# Patient Record
Sex: MALE | Race: WHITE | NOT HISPANIC OR LATINO | URBAN - METROPOLITAN AREA
[De-identification: names, ages, dates, MRNs, and addresses within clinical notes are randomized per-mention and may not be internally consistent; named-entity substitution may affect disease eponyms.]

---

## 2020-07-09 ENCOUNTER — TELEPHONE ENCOUNTER (OUTPATIENT)
Dept: URBAN - METROPOLITAN AREA CLINIC 92 | Facility: CLINIC | Age: 55
End: 2020-07-09

## 2020-07-27 ENCOUNTER — OFFICE VISIT (OUTPATIENT)
Dept: URBAN - METROPOLITAN AREA SURGERY CENTER 31 | Facility: SURGERY CENTER | Age: 55
End: 2020-07-27

## 2020-07-30 ENCOUNTER — OFFICE VISIT (OUTPATIENT)
Dept: URBAN - METROPOLITAN AREA SURGERY CENTER 31 | Facility: SURGERY CENTER | Age: 55
End: 2020-07-30
Payer: COMMERCIAL

## 2020-07-30 ENCOUNTER — CLAIMS CREATED FROM THE CLAIM WINDOW (OUTPATIENT)
Dept: URBAN - METROPOLITAN AREA CLINIC 4 | Facility: CLINIC | Age: 55
End: 2020-07-30
Payer: COMMERCIAL

## 2020-07-30 DIAGNOSIS — K51.00 CHRONIC PANCOLONIC ULCERATIVE COLITIS: ICD-10-CM

## 2020-07-30 DIAGNOSIS — K51.80 OTHER ULCERATIVE COLITIS WITHOUT COMPLICATIONS: ICD-10-CM

## 2020-07-30 DIAGNOSIS — K63.89 OTHER SPECIFIED DISEASES OF INTESTINE: ICD-10-CM

## 2020-07-30 DIAGNOSIS — K51.919 ULCERATIVE COLITIS, UNSPECIFIED WITH UNSPECIFIED COMPLICATIONS: ICD-10-CM

## 2020-07-30 PROCEDURE — 45380 COLONOSCOPY AND BIOPSY: CPT | Performed by: INTERNAL MEDICINE

## 2020-07-30 PROCEDURE — 88305 TISSUE EXAM BY PATHOLOGIST: CPT | Performed by: PATHOLOGY

## 2020-07-30 PROCEDURE — G9937 DIG OR SURV COLSCO: HCPCS | Performed by: INTERNAL MEDICINE

## 2020-07-30 PROCEDURE — G8907 PT DOC NO EVENTS ON DISCHARG: HCPCS | Performed by: INTERNAL MEDICINE

## 2021-02-15 ENCOUNTER — OFFICE VISIT (OUTPATIENT)
Dept: URBAN - METROPOLITAN AREA CLINIC 19 | Facility: CLINIC | Age: 56
End: 2021-02-15

## 2021-03-08 ENCOUNTER — OFFICE VISIT (OUTPATIENT)
Dept: URBAN - METROPOLITAN AREA CLINIC 19 | Facility: CLINIC | Age: 56
End: 2021-03-08
Payer: COMMERCIAL

## 2021-03-08 DIAGNOSIS — K51.00 ULCERATIVE PANCOLITIS WITHOUT COMPLICATION: ICD-10-CM

## 2021-03-08 PROCEDURE — 99212 OFFICE O/P EST SF 10 MIN: CPT | Performed by: INTERNAL MEDICINE

## 2021-03-08 RX ORDER — LISINOPRIL 10 MG/1
TABLET ORAL
Qty: 0 | Refills: 0 | Status: ACTIVE | COMMUNITY
Start: 1900-01-01

## 2021-03-08 RX ORDER — FAMOTIDINE/CA CARB/MAG HYDROX 10-800-165
TABLET,CHEWABLE ORAL
Qty: 0 | Refills: 0 | Status: ACTIVE | COMMUNITY
Start: 1900-01-01

## 2021-03-08 RX ORDER — MESALAMINE 1.2 G/1
4  TABLETS TABLET, DELAYED RELEASE ORAL ONCE A DAY
Qty: 360 TABLET | Refills: 3 | OUTPATIENT
Start: 2021-03-08 | End: 2022-03-03

## 2021-03-08 NOTE — HPI-OTHER HISTORIES
is here for followup if ulcerative pancolitis. He was doing well on Mesalamine 4.8 g/d with no symptoms for over 3 years . We tapered down his dose to 3.6 g /d and he started noticing some symptoms of urgency and loose stools. Colonoscopy also showed mild to moderately active disease along with elevated fecal calprotectin. HEnce his dose was increased back to 4 tabletes a day and he has had no further symptoms since then.  Labs with pCP in Aug 2020 show normal renal function and LFTS.

## 2021-03-09 LAB
A/G RATIO: 1.8
ALBUMIN: 4.9
ALKALINE PHOSPHATASE: 95
ALT (SGPT): 30
AST (SGOT): 27
BASO (ABSOLUTE): 0.1
BASOS: 1
BILIRUBIN, TOTAL: 1.1
BUN/CREATININE RATIO: 13
BUN: 15
CALCIUM: 9.8
CARBON DIOXIDE, TOTAL: 25
CHLORIDE: 101
CREATININE: 1.15
EGFR IF AFRICN AM: 81
EGFR IF NONAFRICN AM: 70
EOS (ABSOLUTE): 0.2
EOS: 2
FERRITIN, SERUM: 215
FOLATE (FOLIC ACID), SERUM: 15.1
GLOBULIN, TOTAL: 2.8
GLUCOSE: 126
HEMATOCRIT: 47.2
HEMATOLOGY COMMENTS:: (no result)
HEMOGLOBIN: 15.6
IMMATURE CELLS: (no result)
IMMATURE GRANS (ABS): 0
IMMATURE GRANULOCYTES: 0
IRON BIND.CAP.(TIBC): 323
IRON SATURATION: 39
IRON: 125
LYMPHS (ABSOLUTE): 2.8
LYMPHS: 33
MCH: 29.1
MCHC: 33.1
MCV: 88
MONOCYTES(ABSOLUTE): 0.6
MONOCYTES: 8
NEUTROPHILS (ABSOLUTE): 4.7
NEUTROPHILS: 56
NRBC: (no result)
PLATELETS: 268
POTASSIUM: 4.6
PROTEIN, TOTAL: 7.7
RBC: 5.37
RDW: 13.1
SODIUM: 139
UIBC: 198
VITAMIN B12: 708
WBC: 8.4

## 2021-03-16 LAB — CALPROTECTIN, FECAL: 197

## 2021-03-25 ENCOUNTER — TELEPHONE ENCOUNTER (OUTPATIENT)
Dept: URBAN - METROPOLITAN AREA CLINIC 92 | Facility: CLINIC | Age: 56
End: 2021-03-25

## 2021-10-26 ENCOUNTER — TELEPHONE ENCOUNTER (OUTPATIENT)
Dept: URBAN - METROPOLITAN AREA CLINIC 92 | Facility: CLINIC | Age: 56
End: 2021-10-26

## 2021-11-19 ENCOUNTER — LAB OUTSIDE AN ENCOUNTER (OUTPATIENT)
Dept: URBAN - METROPOLITAN AREA CLINIC 19 | Facility: CLINIC | Age: 56
End: 2021-11-19

## 2021-11-24 LAB — CALPROTECTIN, FECAL: 125

## 2022-03-17 ENCOUNTER — ERX REFILL RESPONSE (OUTPATIENT)
Dept: URBAN - METROPOLITAN AREA CLINIC 19 | Facility: CLINIC | Age: 57
End: 2022-03-17

## 2022-03-17 RX ORDER — MESALAMINE 1.2 G/1
TAKE 4 TABLETS BY MOUTH EVERY DAY TABLET, DELAYED RELEASE ORAL
Qty: 360 TABLET | Refills: 4 | OUTPATIENT

## 2022-03-28 ENCOUNTER — OFFICE VISIT (OUTPATIENT)
Dept: URBAN - METROPOLITAN AREA CLINIC 19 | Facility: CLINIC | Age: 57
End: 2022-03-28
Payer: COMMERCIAL

## 2022-03-28 ENCOUNTER — WEB ENCOUNTER (OUTPATIENT)
Dept: URBAN - METROPOLITAN AREA CLINIC 19 | Facility: CLINIC | Age: 57
End: 2022-03-28

## 2022-03-28 VITALS
WEIGHT: 201.4 LBS | DIASTOLIC BLOOD PRESSURE: 83 MMHG | OXYGEN SATURATION: 96 % | TEMPERATURE: 98.2 F | SYSTOLIC BLOOD PRESSURE: 173 MMHG | BODY MASS INDEX: 28.83 KG/M2 | HEIGHT: 70 IN | HEART RATE: 63 BPM

## 2022-03-28 DIAGNOSIS — K51.00 ULCERATIVE PANCOLITIS: ICD-10-CM

## 2022-03-28 PROCEDURE — 99213 OFFICE O/P EST LOW 20 MIN: CPT | Performed by: INTERNAL MEDICINE

## 2022-03-28 RX ORDER — FAMOTIDINE/CA CARB/MAG HYDROX 10-800-165
TABLET,CHEWABLE ORAL
Qty: 0 | Refills: 0 | Status: ACTIVE | COMMUNITY
Start: 1900-01-01

## 2022-03-28 RX ORDER — AMLODIPINE BESYLATE 5 MG/1
1 TABLET TABLET ORAL ONCE A DAY
Status: ACTIVE | COMMUNITY

## 2022-03-28 RX ORDER — LISINOPRIL 10 MG/1
TABLET ORAL
Qty: 0 | Refills: 0 | Status: DISCONTINUED | COMMUNITY
Start: 1900-01-01

## 2022-03-28 RX ORDER — MESALAMINE 1.2 G/1
TAKE 4 TABLETS BY MOUTH EVERY DAY TABLET, DELAYED RELEASE ORAL
Qty: 360 TABLET | Refills: 4 | Status: ACTIVE | COMMUNITY

## 2022-03-28 RX ORDER — MESALAMINE 1.2 G/1
TAKE 4 TABLETS BY MOUTH EVERY DAY TABLET, DELAYED RELEASE ORAL
Qty: 360 TABLET | Refills: 4

## 2022-03-28 NOTE — HPI-TODAY'S VISIT:
Mr. Vazquez is here for annual followup of his ulcerative pancolitis. Last labs 6 months ago normal, fecal calprotectin mildlyabnormal at 125 . Since he started fiber supplements stools are normal. Complaint with Mesalamine 4.8 g/d. Prior attempts to cut back to 3.6 g/d caused flare symptoms/

## 2022-06-26 NOTE — PHYSICAL EXAM CONSTITUTIONAL:
well developed, well nourished , in no acute distress , ambulating without difficulty , normal communication ability
2

## 2022-10-11 ENCOUNTER — TELEPHONE ENCOUNTER (OUTPATIENT)
Dept: URBAN - METROPOLITAN AREA CLINIC 19 | Facility: CLINIC | Age: 57
End: 2022-10-11

## 2022-10-12 PROBLEM — 444548001: Status: ACTIVE | Noted: 2022-10-12

## 2022-11-02 ENCOUNTER — WEB ENCOUNTER (OUTPATIENT)
Dept: URBAN - METROPOLITAN AREA SURGERY CENTER 31 | Facility: SURGERY CENTER | Age: 57
End: 2022-11-02

## 2022-11-04 ENCOUNTER — TELEPHONE ENCOUNTER (OUTPATIENT)
Dept: URBAN - METROPOLITAN AREA CLINIC 19 | Facility: CLINIC | Age: 57
End: 2022-11-04

## 2022-11-07 ENCOUNTER — TELEPHONE ENCOUNTER (OUTPATIENT)
Dept: URBAN - METROPOLITAN AREA CLINIC 19 | Facility: CLINIC | Age: 57
End: 2022-11-07

## 2022-11-07 ENCOUNTER — OFFICE VISIT (OUTPATIENT)
Dept: URBAN - METROPOLITAN AREA SURGERY CENTER 31 | Facility: SURGERY CENTER | Age: 57
End: 2022-11-07

## 2023-01-03 ENCOUNTER — WEB ENCOUNTER (OUTPATIENT)
Dept: URBAN - METROPOLITAN AREA SURGERY CENTER 31 | Facility: SURGERY CENTER | Age: 58
End: 2023-01-03

## 2023-01-06 ENCOUNTER — CLAIMS CREATED FROM THE CLAIM WINDOW (OUTPATIENT)
Dept: URBAN - METROPOLITAN AREA SURGERY CENTER 31 | Facility: SURGERY CENTER | Age: 58
End: 2023-01-06

## 2023-01-06 ENCOUNTER — CLAIMS CREATED FROM THE CLAIM WINDOW (OUTPATIENT)
Dept: URBAN - METROPOLITAN AREA SURGERY CENTER 31 | Facility: SURGERY CENTER | Age: 58
End: 2023-01-06
Payer: COMMERCIAL

## 2023-01-06 ENCOUNTER — CLAIMS CREATED FROM THE CLAIM WINDOW (OUTPATIENT)
Dept: URBAN - METROPOLITAN AREA CLINIC 4 | Facility: CLINIC | Age: 58
End: 2023-01-06
Payer: COMMERCIAL

## 2023-01-06 DIAGNOSIS — D12.2 BENIGN NEOPLASM OF ASCENDING COLON: ICD-10-CM

## 2023-01-06 DIAGNOSIS — K51.00 ULCERATIVE PANCOLITIS: ICD-10-CM

## 2023-01-06 DIAGNOSIS — D12.2 ADENOMA OF ASCENDING COLON: ICD-10-CM

## 2023-01-06 DIAGNOSIS — K51.00 ACUTE ULCERATIVE PANCOLITIS: ICD-10-CM

## 2023-01-06 PROBLEM — 444548001: Status: ACTIVE | Noted: 2022-03-28

## 2023-01-06 PROCEDURE — 45380 COLONOSCOPY AND BIOPSY: CPT | Performed by: INTERNAL MEDICINE

## 2023-01-06 PROCEDURE — 45385 COLONOSCOPY W/LESION REMOVAL: CPT | Performed by: INTERNAL MEDICINE

## 2023-01-06 PROCEDURE — G8907 PT DOC NO EVENTS ON DISCHARG: HCPCS | Performed by: INTERNAL MEDICINE

## 2023-01-06 PROCEDURE — 88305 TISSUE EXAM BY PATHOLOGIST: CPT | Performed by: PATHOLOGY

## 2023-01-06 RX ORDER — FAMOTIDINE/CA CARB/MAG HYDROX 10-800-165
TABLET,CHEWABLE ORAL
Qty: 0 | Refills: 0 | Status: ACTIVE | COMMUNITY
Start: 1900-01-01

## 2023-01-06 RX ORDER — BUDESONIDE 3 MG/1
3 CAPSULES CAPSULE ORAL ONCE A DAY
Qty: 90 | Refills: 2 | OUTPATIENT
Start: 2023-01-06

## 2023-01-06 RX ORDER — MESALAMINE 1.2 G/1
TAKE 4 TABLETS BY MOUTH EVERY DAY TABLET, DELAYED RELEASE ORAL
Qty: 360 TABLET | Refills: 4 | Status: ACTIVE | COMMUNITY

## 2023-01-06 RX ORDER — AMLODIPINE BESYLATE 5 MG/1
1 TABLET TABLET ORAL ONCE A DAY
Status: ACTIVE | COMMUNITY

## 2023-01-29 ENCOUNTER — ERX REFILL RESPONSE (OUTPATIENT)
Dept: URBAN - METROPOLITAN AREA CLINIC 19 | Facility: CLINIC | Age: 58
End: 2023-01-29

## 2023-01-29 RX ORDER — BUDESONIDE 3 MG/1
3 CAPSULES CAPSULE ORAL ONCE A DAY
Qty: 90 | Refills: 2 | OUTPATIENT

## 2023-01-29 RX ORDER — BUDESONIDE 3 MG/1
TAKE 3 CAPSULES BY MOUTH EVERY DAY CAPSULE, GELATIN COATED ORAL
Qty: 90 CAPSULE | Refills: 2 | OUTPATIENT

## 2023-04-13 LAB — CALPROTECTIN, FECAL: 310

## 2023-05-01 ENCOUNTER — ERX REFILL RESPONSE (OUTPATIENT)
Dept: URBAN - METROPOLITAN AREA CLINIC 19 | Facility: CLINIC | Age: 58
End: 2023-05-01

## 2023-05-01 ENCOUNTER — TELEPHONE ENCOUNTER (OUTPATIENT)
Dept: URBAN - METROPOLITAN AREA CLINIC 19 | Facility: CLINIC | Age: 58
End: 2023-05-01

## 2023-05-01 RX ORDER — BUDESONIDE 3 MG/1
3 CAPSULES CAPSULE ORAL ONCE A DAY
Qty: 90 CAPSULE | Refills: 2 | OUTPATIENT

## 2023-05-01 RX ORDER — BUDESONIDE 3 MG/1
TAKE 3 CAPSULES BY MOUTH EVERY DAY CAPSULE, GELATIN COATED ORAL
Qty: 90 CAPSULE | Refills: 2 | OUTPATIENT

## 2023-05-02 ENCOUNTER — OFFICE VISIT (OUTPATIENT)
Dept: URBAN - METROPOLITAN AREA CLINIC 19 | Facility: CLINIC | Age: 58
End: 2023-05-02
Payer: COMMERCIAL

## 2023-05-02 VITALS
SYSTOLIC BLOOD PRESSURE: 139 MMHG | DIASTOLIC BLOOD PRESSURE: 72 MMHG | WEIGHT: 200.8 LBS | BODY MASS INDEX: 28.75 KG/M2 | HEIGHT: 70 IN | TEMPERATURE: 98.1 F

## 2023-05-02 DIAGNOSIS — K51.00 ULCERATIVE PANCOLITIS: ICD-10-CM

## 2023-05-02 DIAGNOSIS — E11.9 TYPE 2 DIABETES MELLITUS WITHOUT COMPLICATION, WITHOUT LONG-TERM CURRENT USE OF INSULIN: ICD-10-CM

## 2023-05-02 PROCEDURE — 99214 OFFICE O/P EST MOD 30 MIN: CPT | Performed by: INTERNAL MEDICINE

## 2023-05-02 RX ORDER — MESALAMINE 1.2 G/1
TAKE 4 TABLETS BY MOUTH EVERY DAY TABLET, DELAYED RELEASE ORAL
Qty: 360 TABLET | Refills: 4 | COMMUNITY

## 2023-05-02 RX ORDER — BUDESONIDE 3 MG/1
TAKE 3 CAPSULES BY MOUTH EVERY DAY CAPSULE, GELATIN COATED ORAL
Qty: 90 CAPSULE | Refills: 2 | COMMUNITY

## 2023-05-02 RX ORDER — FAMOTIDINE/CA CARB/MAG HYDROX 10-800-165
TABLET,CHEWABLE ORAL
Qty: 0 | Refills: 0 | COMMUNITY
Start: 1900-01-01

## 2023-05-02 RX ORDER — AMLODIPINE BESYLATE 5 MG/1
1 TABLET TABLET ORAL ONCE A DAY
COMMUNITY

## 2023-05-02 NOTE — HPI-TODAY'S VISIT:
Levi is here for follow-up of his ulcerative colitis.  His recent surveillance colonoscopy showed active inflammation at the level of hepatic flexure and ascending colon.  Biopsies confirmed chronic ulcerative colitis.  His fecal calprotectin was also elevated at 154 although he is pretty much asymptomatic.  Given 8-week course of budesonide and he is here for follow-up.  He was supposed to get another fecal calprotectin but unfortunately the sample was lost.  We checked with both [facility] and [facility].  He feels fine and has no other symptoms otherwise.  He has completed his budesonide 10 days ago.

## 2023-05-03 PROBLEM — 313436004: Status: ACTIVE | Noted: 2023-05-03

## 2023-05-25 ENCOUNTER — LAB OUTSIDE AN ENCOUNTER (OUTPATIENT)
Dept: URBAN - METROPOLITAN AREA CLINIC 19 | Facility: CLINIC | Age: 58
End: 2023-05-25

## 2023-06-02 LAB — CALPROTECTIN, FECAL: 99

## 2023-06-07 ENCOUNTER — TELEPHONE ENCOUNTER (OUTPATIENT)
Dept: URBAN - METROPOLITAN AREA CLINIC 19 | Facility: CLINIC | Age: 58
End: 2023-06-07

## 2023-06-15 ENCOUNTER — TELEPHONE ENCOUNTER (OUTPATIENT)
Dept: URBAN - METROPOLITAN AREA CLINIC 19 | Facility: CLINIC | Age: 58
End: 2023-06-15

## 2023-06-17 ENCOUNTER — ERX REFILL RESPONSE (OUTPATIENT)
Dept: URBAN - METROPOLITAN AREA CLINIC 19 | Facility: CLINIC | Age: 58
End: 2023-06-17

## 2023-06-17 RX ORDER — MESALAMINE 1.2 G/1
TAKE 4 TABLETS BY MOUTH EVERY DAY TABLET, DELAYED RELEASE ORAL
Qty: 360 TABLET | Refills: 4 | OUTPATIENT

## 2023-07-12 ENCOUNTER — TELEPHONE ENCOUNTER (OUTPATIENT)
Dept: URBAN - METROPOLITAN AREA CLINIC 19 | Facility: CLINIC | Age: 58
End: 2023-07-12

## 2023-07-16 ENCOUNTER — ERX REFILL RESPONSE (OUTPATIENT)
Dept: URBAN - METROPOLITAN AREA CLINIC 19 | Facility: CLINIC | Age: 58
End: 2023-07-16

## 2023-07-16 RX ORDER — MESALAMINE 1.2 G/1
4 TABLETS WITH A MEAL TABLET, DELAYED RELEASE ORAL ONCE A DAY
Qty: 360 TABLET | Refills: 3 | OUTPATIENT

## 2023-07-16 RX ORDER — MESALAMINE 1.2 G/1
TAKE 4 TABLETS BY MOUTH EVERY DAY TABLET, DELAYED RELEASE ORAL
Qty: 120 TABLET | Refills: 2 | OUTPATIENT

## 2023-08-19 LAB — CALPROTECTIN, FECAL: 39

## 2024-04-08 ENCOUNTER — OV EP (OUTPATIENT)
Dept: URBAN - METROPOLITAN AREA CLINIC 19 | Facility: CLINIC | Age: 59
End: 2024-04-08
Payer: COMMERCIAL

## 2024-04-08 VITALS
OXYGEN SATURATION: 97 % | SYSTOLIC BLOOD PRESSURE: 122 MMHG | TEMPERATURE: 97.9 F | BODY MASS INDEX: 27.06 KG/M2 | HEART RATE: 79 BPM | HEIGHT: 70 IN | WEIGHT: 189 LBS | DIASTOLIC BLOOD PRESSURE: 81 MMHG

## 2024-04-08 DIAGNOSIS — K51.00 ULCERATIVE PANCOLITIS: ICD-10-CM

## 2024-04-08 PROCEDURE — 99213 OFFICE O/P EST LOW 20 MIN: CPT | Performed by: INTERNAL MEDICINE

## 2024-04-08 RX ORDER — BUDESONIDE 3 MG/1
TAKE 3 CAPSULES BY MOUTH EVERY DAY CAPSULE, GELATIN COATED ORAL
Qty: 90 CAPSULE | Refills: 2 | Status: ON HOLD | COMMUNITY

## 2024-04-08 RX ORDER — FAMOTIDINE/CA CARB/MAG HYDROX 10-800-165
TABLET,CHEWABLE ORAL
Qty: 0 | Refills: 0 | Status: ACTIVE | COMMUNITY
Start: 1900-01-01

## 2024-04-08 RX ORDER — AMLODIPINE BESYLATE 5 MG/1
1 TABLET TABLET ORAL ONCE A DAY
Status: DISCONTINUED | COMMUNITY

## 2024-04-08 RX ORDER — MESALAMINE 1.2 G/1
4 TABLETS WITH A MEAL TABLET, DELAYED RELEASE ORAL ONCE A DAY
Qty: 360 TABLET | Refills: 3 | Status: ACTIVE | COMMUNITY

## 2024-04-08 NOTE — HPI-TODAY'S VISIT:
Levi is here for follow-up of his ulcerative colitis.  His recent surveillance colonoscopy showed active inflammation at the level of hepatic flexure and ascending colon.  Biopsies confirmed chronic ulcerative colitis.  His fecal calprotectin was also elevated at 154 although he is pretty much asymptomatic.  Given 8-week course of budesonide and he is here for follow-up.  He was supposed to get another fecal calprotectin but unfortunately the sample was lost.  We checked with both [facility] and [facility].  He feels fine and has no other symptoms otherwise.  He has completed his budesonide 10 days ago.  4/08/24 Most recent fecal calprotectin Aug 2023 was normal- this was after an 8 week course of Budesonide. he is on 4.8 g of mesalamine daily. No symptoms . Feels well. Constipationis his only problem. Has hard stools intermittently.

## 2024-04-10 LAB
A/G RATIO: 1.8
ABSOLUTE BASOPHILS: 78
ABSOLUTE EOSINOPHILS: 121
ABSOLUTE LYMPHOCYTES: 2194
ABSOLUTE MONOCYTES: 504
ABSOLUTE NEUTROPHILS: 4203
ALBUMIN: 4.7
ALKALINE PHOSPHATASE: 95
ALT (SGPT): 19
AST (SGOT): 21
BASOPHILS: 1.1
BILIRUBIN, TOTAL: 0.9
BUN/CREATININE RATIO: (no result)
BUN: 16
CALCIUM: 9.7
CARBON DIOXIDE, TOTAL: 28
CHLORIDE: 99
CREATININE: 1.05
EGFR: 81
EOSINOPHILS: 1.7
GLOBULIN, TOTAL: 2.6
GLUCOSE: 123
HEMATOCRIT: 45
HEMOGLOBIN: 14.8
LYMPHOCYTES: 30.9
MCH: 29.9
MCHC: 32.9
MCV: 90.9
MONOCYTES: 7.1
MPV: 12.3
NEUTROPHILS: 59.2
PLATELET COUNT: 244
POTASSIUM: 4.7
PROTEIN, TOTAL: 7.3
RDW: 12.8
RED BLOOD CELL COUNT: 4.95
SODIUM: 137
WHITE BLOOD CELL COUNT: 7.1

## 2024-06-09 ENCOUNTER — ERX REFILL RESPONSE (OUTPATIENT)
Dept: URBAN - METROPOLITAN AREA CLINIC 19 | Facility: CLINIC | Age: 59
End: 2024-06-09

## 2024-06-09 RX ORDER — MESALAMINE 1.2 G/1
4 TABLETS WITH A MEAL ORALLY ONCE A DAY 90 DAYS TABLET, DELAYED RELEASE ORAL
Qty: 360 TABLET | Refills: 3 | OUTPATIENT

## 2024-06-09 RX ORDER — MESALAMINE 1.2 G/1
4 TABLETS WITH A MEAL TABLET, DELAYED RELEASE ORAL ONCE A DAY
Qty: 360 TABLET | Refills: 3 | OUTPATIENT

## 2024-09-13 ENCOUNTER — WEB ENCOUNTER (OUTPATIENT)
Dept: URBAN - METROPOLITAN AREA CLINIC 19 | Facility: CLINIC | Age: 59
End: 2024-09-13

## 2024-09-13 ENCOUNTER — TELEPHONE ENCOUNTER (OUTPATIENT)
Dept: URBAN - METROPOLITAN AREA CLINIC 19 | Facility: CLINIC | Age: 59
End: 2024-09-13

## 2024-09-13 RX ORDER — BUDESONIDE 3 MG/1
TAKE 3 CAPSULES BY MOUTH CAPSULE, GELATIN COATED ORAL
Qty: 90 | Refills: 1

## 2024-10-01 ENCOUNTER — OFFICE VISIT (OUTPATIENT)
Dept: URBAN - METROPOLITAN AREA CLINIC 19 | Facility: CLINIC | Age: 59
End: 2024-10-01
Payer: COMMERCIAL

## 2024-10-01 ENCOUNTER — DASHBOARD ENCOUNTERS (OUTPATIENT)
Age: 59
End: 2024-10-01

## 2024-10-01 VITALS
WEIGHT: 190 LBS | HEIGHT: 70 IN | BODY MASS INDEX: 27.2 KG/M2 | HEART RATE: 80 BPM | OXYGEN SATURATION: 98 % | TEMPERATURE: 98.2 F | SYSTOLIC BLOOD PRESSURE: 132 MMHG | DIASTOLIC BLOOD PRESSURE: 81 MMHG

## 2024-10-01 DIAGNOSIS — K51.00 ULCERATIVE PANCOLITIS WITHOUT COMPLICATION: ICD-10-CM

## 2024-10-01 PROCEDURE — 99213 OFFICE O/P EST LOW 20 MIN: CPT | Performed by: INTERNAL MEDICINE

## 2024-10-01 RX ORDER — BUDESONIDE 3 MG/1
TAKE 3 CAPSULES BY MOUTH CAPSULE, GELATIN COATED ORAL
Qty: 90 | Refills: 3

## 2024-10-01 RX ORDER — MESALAMINE 1.2 G/1
4 TABLETS WITH A MEAL ORALLY ONCE A DAY 90 DAYS TABLET, DELAYED RELEASE ORAL
Qty: 360 TABLET | Refills: 3 | Status: ACTIVE | COMMUNITY

## 2024-10-01 RX ORDER — BUDESONIDE 3 MG/1
TAKE 3 CAPSULES BY MOUTH CAPSULE, GELATIN COATED ORAL
Qty: 90 | Refills: 1 | Status: ACTIVE | COMMUNITY

## 2024-10-01 RX ORDER — FAMOTIDINE/CA CARB/MAG HYDROX 10-800-165
TABLET,CHEWABLE ORAL
Qty: 0 | Refills: 0 | Status: ACTIVE | COMMUNITY
Start: 1900-01-01

## 2024-10-01 NOTE — HPI-TODAY'S VISIT:
Levi is here for follow-up of his ulcerative colitis.  His recent surveillance colonoscopy showed active inflammation at the level of hepatic flexure and ascending colon.  Biopsies confirmed chronic ulcerative colitis.  His fecal calprotectin was also elevated at 154 although he is pretty much asymptomatic.  Given 8-week course of budesonide and he is here for follow-up.  He was supposed to get another fecal calprotectin but unfortunately the sample was lost.  We checked with both [facility] and [facility].  He feels fine and has no other symptoms otherwise.  He has completed his budesonide 10 days ago.  4/08/24 Most recent fecal calprotectin Aug 2023 was normal- this was after an 8 week course of Budesonide. He is on 4.8 g of mesalamine daily. No symptoms . Feels well. Constipation is his only problem. Has hard stools intermittently.  10/1/24 Here for 6 month followup of ulcerative colitis . He is currently feeling better after being on Budesonide 9 mg/d for 3 weeks. Stool studies negative for Clostridum difficile. Continuing mesalamine at 4.8 g/d.

## 2024-11-07 ENCOUNTER — LAB OUTSIDE AN ENCOUNTER (OUTPATIENT)
Dept: URBAN - METROPOLITAN AREA CLINIC 19 | Facility: CLINIC | Age: 59
End: 2024-11-07

## 2024-11-11 ENCOUNTER — OFFICE VISIT (OUTPATIENT)
Dept: URBAN - METROPOLITAN AREA CLINIC 19 | Facility: CLINIC | Age: 59
End: 2024-11-11

## 2024-11-16 LAB — CALPROTECTIN, FECAL: 482

## 2025-01-27 ENCOUNTER — OFFICE VISIT (OUTPATIENT)
Dept: URBAN - METROPOLITAN AREA CLINIC 19 | Facility: CLINIC | Age: 60
End: 2025-01-27

## 2025-01-27 RX ORDER — BUDESONIDE 3 MG/1
TAKE 3 CAPSULES BY MOUTH CAPSULE, GELATIN COATED ORAL
Qty: 90 | Refills: 3 | Status: ACTIVE | COMMUNITY

## 2025-01-27 RX ORDER — MESALAMINE 1.2 G/1
4 TABLETS WITH A MEAL ORALLY ONCE A DAY 90 DAYS TABLET, DELAYED RELEASE ORAL
Qty: 360 TABLET | Refills: 3 | Status: ACTIVE | COMMUNITY

## 2025-01-27 RX ORDER — FAMOTIDINE/CA CARB/MAG HYDROX 10-800-165
TABLET,CHEWABLE ORAL
Qty: 0 | Refills: 0 | Status: ACTIVE | COMMUNITY
Start: 1900-01-01

## 2025-03-24 ENCOUNTER — OFFICE VISIT (OUTPATIENT)
Dept: URBAN - METROPOLITAN AREA CLINIC 19 | Facility: CLINIC | Age: 60
End: 2025-03-24
Payer: COMMERCIAL

## 2025-03-24 ENCOUNTER — OFFICE VISIT (OUTPATIENT)
Dept: URBAN - METROPOLITAN AREA CLINIC 19 | Facility: CLINIC | Age: 60
End: 2025-03-24

## 2025-03-24 DIAGNOSIS — K51.00 ULCERATIVE PANCOLITIS WITHOUT COMPLICATION: ICD-10-CM

## 2025-03-24 PROCEDURE — 99212 OFFICE O/P EST SF 10 MIN: CPT | Performed by: INTERNAL MEDICINE

## 2025-03-24 RX ORDER — FAMOTIDINE/CA CARB/MAG HYDROX 10-800-165
TABLET,CHEWABLE ORAL
Qty: 0 | Refills: 0 | Status: ACTIVE | COMMUNITY
Start: 1900-01-01

## 2025-03-24 RX ORDER — MESALAMINE 1.2 G/1
4 TABLETS WITH A MEAL ORALLY ONCE A DAY 90 DAYS TABLET, DELAYED RELEASE ORAL
Qty: 360 TABLET | Refills: 3 | Status: ACTIVE | COMMUNITY

## 2025-03-24 RX ORDER — BUDESONIDE 3 MG/1
TAKE 3 CAPSULES BY MOUTH CAPSULE, GELATIN COATED ORAL
Qty: 90 | Refills: 3 | Status: ACTIVE | COMMUNITY

## 2025-03-24 RX ORDER — METFORMIN HYDROCHLORIDE 1000 MG/1
1 TABLET WITH A MEAL TABLET, FILM COATED ORAL ONCE A DAY
Status: ACTIVE | COMMUNITY

## 2025-03-24 NOTE — HPI-TODAY'S VISIT:
6 month f/u Ulcerative Colitis. Has been doing well on 4.8 g of mesalamine . No diarrhea or constipation, Last fecal calprotectin 468 in Oct 2024. Admits to diet worsening recently since he is also caring for his father in a wheel chair and is sick.

## 2025-03-25 LAB
ABSOLUTE BASOPHILS: 79
ABSOLUTE EOSINOPHILS: 119
ABSOLUTE LYMPHOCYTES: 2378
ABSOLUTE MONOCYTES: 569
ABSOLUTE NEUTROPHILS: 4756
ALBUMIN/GLOBULIN RATIO: 1.8
ALBUMIN: 4.8
ALKALINE PHOSPHATASE: 90
ALT: 22
AST: 18
BASOPHILS: 1
BILIRUBIN, TOTAL: 0.9
BUN/CREATININE RATIO: (no result)
C-REACTIVE PROTEIN, QUANT: <3
CALCIUM: 9.8
CARBON DIOXIDE: 28
CHLORIDE: 100
CREATININE: 0.97
EOSINOPHILS: 1.5
GLOBULIN: 2.6
GLUCOSE: 149
HEMATOCRIT: 46.4
HEMOGLOBIN: 15.5
LYMPHOCYTES: 30.1
MCH: 30.1
MCHC: 33.4
MCV: 90.1
MONOCYTES: 7.2
MPV: 12.1
NEUTROPHILS: 60.2
PLATELET COUNT: 279
POTASSIUM: 4.6
PROTEIN, TOTAL: 7.4
RDW: 12.5
RED BLOOD CELL COUNT: 5.15
SED RATE BY MODIFIED: 2
SODIUM: 138
UREA NITROGEN (BUN): 15
WHITE BLOOD CELL COUNT: 7.9

## 2025-06-12 ENCOUNTER — ERX REFILL RESPONSE (OUTPATIENT)
Dept: URBAN - METROPOLITAN AREA CLINIC 19 | Facility: CLINIC | Age: 60
End: 2025-06-12

## 2025-06-12 RX ORDER — MESALAMINE 1.2 G/1
4 TABLETS WITH A MEAL ORALLY ONCE A DAY 90 DAYS TABLET, DELAYED RELEASE ORAL
Qty: 360 TABLET | Refills: 3 | OUTPATIENT